# Patient Record
Sex: MALE | Race: WHITE | NOT HISPANIC OR LATINO
[De-identification: names, ages, dates, MRNs, and addresses within clinical notes are randomized per-mention and may not be internally consistent; named-entity substitution may affect disease eponyms.]

---

## 2022-05-02 ENCOUNTER — APPOINTMENT (OUTPATIENT)
Dept: ORTHOPEDIC SURGERY | Facility: CLINIC | Age: 52
End: 2022-05-02
Payer: OTHER MISCELLANEOUS

## 2022-05-02 VITALS — HEIGHT: 70 IN | WEIGHT: 234 LBS | BODY MASS INDEX: 33.5 KG/M2

## 2022-05-02 DIAGNOSIS — Z78.9 OTHER SPECIFIED HEALTH STATUS: ICD-10-CM

## 2022-05-02 DIAGNOSIS — M22.42 CHONDROMALACIA PATELLAE, LEFT KNEE: ICD-10-CM

## 2022-05-02 PROBLEM — Z00.00 ENCOUNTER FOR PREVENTIVE HEALTH EXAMINATION: Status: ACTIVE | Noted: 2022-05-02

## 2022-05-02 PROCEDURE — 20610 DRAIN/INJ JOINT/BURSA W/O US: CPT | Mod: LT

## 2022-05-02 PROCEDURE — 99213 OFFICE O/P EST LOW 20 MIN: CPT | Mod: 25

## 2022-05-02 PROCEDURE — 99072 ADDL SUPL MATRL&STAF TM PHE: CPT

## 2022-05-02 NOTE — PROCEDURE
[Large Joint Injection] : Large joint injection [Left] : of the left [Knee] : knee [Pain] : pain [Inflammation] : inflammation [X-ray evidence of Osteoarthritis on this or prior visit] : x-ray evidence of Osteoarthritis on this or prior visit [Alcohol] : alcohol [Sterile technique used] : sterile technique used [___ cc    3mg] :  Betamethasone (Celestone) ~Vcc of 3mg [___ cc    1%] : Lidocaine ~Vcc of 1%  [Call if redness, pain or fever occur] : call if redness, pain or fever occur [Apply ice for 15min out of every hour for the next 12-24 hours as tolerated] : apply ice for 15 minutes out of every hour for the next 12-24 hours as tolerated [Previous OTC use and PT nontherapeutic] : patient has tried OTC's including aspirin, Ibuprofen, Aleve, etc or prescription NSAIDS, and/or exercises at home and/or physical therapy without satisfactory response [Patient had decreased mobility in the joint] : patient had decreased mobility in the joint [Risks, benefits, alternatives discussed / Verbal consent obtained] : the risks benefits, and alternatives have been discussed, and verbal consent was obtained

## 2022-05-02 NOTE — HISTORY OF PRESENT ILLNESS
[Work related] : work related [Gradual] : gradual [8] : 8 [4] : 4 [Dull/Aching] : dull/aching [Constant] : constant [Household chores] : household chores [Sleep] : sleep [Rest] : rest [Full time] : Work status: full time [de-identified] : Patient is here for a follow up. Patient is being treated for left knee chondromalacia. Patient had last cortisone injection on 10/15/2021. Patient reports that just recently he had recurrence of his pain, interested in repeat CSI at this time. Patient is not a diabetic. [] : Post Surgical Visit: no [FreeTextEntry1] : Left knee [FreeTextEntry3] : 1/21/2013 [FreeTextEntry5] : Patient states he twisted knee coming down steps. 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

## 2022-05-02 NOTE — ASSESSMENT
[FreeTextEntry1] : 50 yo male presenting for f/u of moderate patellofemoral chondromalacia. Patient had CSI on 10/15/21 with approximately 7 months of relief with recent recurrence, interested in repeat CSI.\par -Repeat CSI given today, patient tolerated well\par -Activities as tolerated\par -Patient able to continue to work full duty without restrictions. \par -Rest, ice, NSAIDs PRN for pain\par -All questions answered\par -F/u PRN\par

## 2022-05-02 NOTE — PHYSICAL EXAM
[Left] : left knee [NL (140)] : flexion 140 degrees [NL (0)] : extension 0 degrees [5___] : hamstring 5[unfilled]/5 [] : non-antalgic

## 2022-10-01 ENCOUNTER — NON-APPOINTMENT (OUTPATIENT)
Age: 52
End: 2022-10-01

## 2022-10-04 ENCOUNTER — APPOINTMENT (OUTPATIENT)
Dept: NEUROSURGERY | Facility: CLINIC | Age: 52
End: 2022-10-04

## 2022-10-04 VITALS
SYSTOLIC BLOOD PRESSURE: 137 MMHG | TEMPERATURE: 97.9 F | DIASTOLIC BLOOD PRESSURE: 87 MMHG | WEIGHT: 227 LBS | HEART RATE: 69 BPM | BODY MASS INDEX: 32.5 KG/M2 | HEIGHT: 70 IN | OXYGEN SATURATION: 98 %

## 2022-10-04 PROCEDURE — 99203 OFFICE O/P NEW LOW 30 MIN: CPT

## 2022-10-04 NOTE — REASON FOR VISIT
[New Patient Visit] : a new patient visit [Spouse] : spouse [FreeTextEntry1] : incidental cervical syrinx, neck pain and bilateral arm symptoms
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
## 2022-11-07 ENCOUNTER — APPOINTMENT (OUTPATIENT)
Dept: NEUROSURGERY | Facility: CLINIC | Age: 52
End: 2022-11-07

## 2022-11-07 VITALS
BODY MASS INDEX: 32.5 KG/M2 | TEMPERATURE: 97.9 F | WEIGHT: 227 LBS | OXYGEN SATURATION: 96 % | HEIGHT: 70 IN | DIASTOLIC BLOOD PRESSURE: 87 MMHG | HEART RATE: 72 BPM | SYSTOLIC BLOOD PRESSURE: 142 MMHG

## 2022-11-07 DIAGNOSIS — R20.2 PARESTHESIA OF SKIN: ICD-10-CM

## 2022-11-07 DIAGNOSIS — M54.81 OCCIPITAL NEURALGIA: ICD-10-CM

## 2022-11-07 DIAGNOSIS — M54.2 CERVICALGIA: ICD-10-CM

## 2022-11-07 PROCEDURE — 99213 OFFICE O/P EST LOW 20 MIN: CPT

## 2022-11-07 NOTE — CONSULT LETTER
[Dear  ___] : Dear  [unfilled], [Courtesy Letter:] : I had the pleasure of seeing your patient, [unfilled], in my office today. [Sincerely,] : Sincerely, [FreeTextEntry1] : This very pleasant 52-year-old gentleman who is extremely fit and a former collegiate athlete and professional arm wrestler returns for further evaluation of his chronic neck pain with headaches as well as bilateral arm symptoms left worse than right.  At our previous assessment 1 month ago we reviewed MRI imaging of the cervical spine which delineated moderate degenerative spondylotic changes at C3-4 and a small incidental syrinx at C6-7.  However the imaging findings did not correlate with the patient's symptom profile.  The patient has now undergone an EMG nerve conduction study of the upper extremities.\par \par I have reviewed in detail with the patient his EMG results which did not identify any radiculopathy or peripheral compression neuropathy.\par \par I did review with the patient once again his MRI of the cervical spine performed at [facility] and pointed out the degenerative changes as indicated above.\par \par There is no change in the patient's neurologic examination since our previous assessment 4 weeks ago.\par \par I have discussed with the patient that I feel he has 2 distinct ongoing problems.  The patient's headache profile is more consistent with cervicalgia or suboccipital muscular driven headaches with a occipital neuralgia profile.  He has received benefit in the past from focused massage in this region with temporary but significant benefit.  I would like the patient to be seen by neurology headache specialist for the purpose of considering Botox or trigger point injections for a longer term solution to his profile.  In addition I will have the patient seen by an orthopedic specialist with respect to a possible myofascial compartment syndrome which is creating his peripheral upper extremity symptoms which are more profound at nighttime or whenever his arms are in a flexed position.\par \par The patient indicated good understanding of my descriptions and explanations and he is very interested in pursuing these treatment modalities.  I will see the patient again on an as-needed basis.\par \par Thank you for very kindly including me in the evaluation and support of your patient.  Please do not hesitate to contact me should you have any concerns or questions regarding this evaluation or the patient's ongoing follow-up care. [FreeTextEntry3] : Quentin Stuart MD, PhD, FRCPSC \par Attending Neurosurgeon \par  of Neurosurgery \par [facility] \par [address], [address] floor \[place] [address] \par Office: [phone] \par Fax: [phone]\par \par

## 2023-05-16 ENCOUNTER — APPOINTMENT (OUTPATIENT)
Dept: NEUROLOGY | Facility: CLINIC | Age: 53
End: 2023-05-16
Payer: COMMERCIAL

## 2023-05-16 VITALS
DIASTOLIC BLOOD PRESSURE: 87 MMHG | HEIGHT: 70 IN | WEIGHT: 220 LBS | BODY MASS INDEX: 31.5 KG/M2 | HEART RATE: 68 BPM | TEMPERATURE: 97.8 F | SYSTOLIC BLOOD PRESSURE: 135 MMHG

## 2023-05-16 DIAGNOSIS — R51.9 HEADACHE, UNSPECIFIED: ICD-10-CM

## 2023-05-16 PROCEDURE — 99204 OFFICE O/P NEW MOD 45 MIN: CPT

## 2023-05-16 NOTE — HISTORY OF PRESENT ILLNESS
[FreeTextEntry1] : 52-year-old man right-handed with a history of\par Headaches daily frequency.  Reports for the last year has been much on a regular basis headache of some urinary, usually starts in the temple area, feels a pressure heaviness can be crushing at times.  And severe enough that he can affect his concentration, he just wants to sit down in the quiet dark room.  No nausea no lightheadedness no change in vision, trouble speaking, no associated numbness tingling of the face, extremities.  Headaches may last hours, most common trigger is humidity changes.  In the neck because it can become very stiff.  Will take ibuprofen tonsils for fibrosa time.  Partial relief.  He has been evaluated in the past treated by chiropractic, neurosurgery, MRI of the cervical spine demonstrated multilevel degenerative disc, small syrinx at C6-C7.\par He also complains of numbness, tingling sensation of bothHands and arms, can wake him up at night, can wake him up during the morning.  During the day he is holding something grabbing something he will feel his ankle now.  No weakness, denies any sensation in the feet.\par An EMG nerve conduction performed last year, was unremarkable.

## 2023-05-16 NOTE — DISCUSSION/SUMMARY
[FreeTextEntry1] : 52-year-old man with a history of chronic headaches, unremarkable neurological examination.  No local tenderness is noted occipital or in the neck area.  Likely primary headache syndrome, chronic migraines, transformed migraines.\par We will request an MRI of the brain to rule out any structural normalities.\par Samples of Nurtec 75 mg daily as needed headache.\par Samples of Ubrelvy 100 mg tablet, 1 as needed for headache, can repeat within 2 hours.  Maximum dose of 200 mg/day.\par Patient advised to call for prescription, if useful.\par History of bilateral hand numbness, involving the hand sometimes the arms and cells.  Unlikely related to his MRI of the cervical spine findings.  Most likely peripheral nerve problem, either carpal tunnel although not proven by nerve conduction electromyography of the upper extremities, may also represent a more proximal median neuropathy at the wrist.\par Advised patient to reconsider repeating the study.\par Return after 1 month.

## 2023-05-16 NOTE — PHYSICAL EXAM
[Oriented To Time, Place, And Person] : oriented to person, place, and time [Impaired Insight] : insight and judgment were intact [Affect] : the affect was normal [Person] : oriented to person [Place] : oriented to place [Time] : oriented to time [Concentration Intact] : normal concentrating ability [Visual Intact] : visual attention was ~T not ~L decreased [Writing A Sentence] : no difficulty writing a sentence [Fluency] : fluency intact [Comprehension] : comprehension intact [Reading] : reading intact [Past History] : adequate knowledge of personal past history [Cranial Nerves Optic (II)] : visual acuity intact bilaterally,  visual fields full to confrontation, pupils equal round and reactive to light [Cranial Nerves Oculomotor (III)] : extraocular motion intact [Cranial Nerves Trigeminal (V)] : facial sensation intact symmetrically [Cranial Nerves Facial (VII)] : face symmetrical [Cranial Nerves Vestibulocochlear (VIII)] : hearing was intact bilaterally [Cranial Nerves Glossopharyngeal (IX)] : tongue and palate midline [Cranial Nerves Accessory (XI - Cranial And Spinal)] : head turning and shoulder shrug symmetric [Cranial Nerves Hypoglossal (XII)] : there was no tongue deviation with protrusion [Motor Tone] : muscle tone was normal in all four extremities [Motor Strength] : muscle strength was normal in all four extremities [No Muscle Atrophy] : normal bulk in all four extremities [Motor Handedness Right-Handed] : the patient is right hand dominant [Paresis Pronator Drift Right-Sided] : no pronator drift on the right [Paresis Pronator Drift Left-Sided] : no pronator drift on the left [Sensation Tactile Decrease] : light touch was intact [Abnormal Walk] : normal gait [Balance] : balance was intact [Past-pointing] : there was no past-pointing [Tremor] : no tremor present [Dysdiadochokinesia Bilaterally] : not present [Coordination - Dysmetria Impaired Finger-to-Nose Bilateral] : not present [1+] : Ankle jerk left 1+ [Neck Appearance] : the appearance of the neck was normal [] : the neck was supple [Neck Cervical Mass (___cm)] : no neck mass was observed [No Visual Abnormalities] : no visible abnormalities [Normal Lordosis] : normal lordosis [No Scoliosis] : no scoliosis [No Tenderness to Palpation] : no spine tenderness on palpation [No Masses] : no masses [Full ROM] : full ROM [No Pain with ROM] : no pain with motion in any direction

## 2023-06-02 ENCOUNTER — APPOINTMENT (OUTPATIENT)
Dept: MRI IMAGING | Facility: CLINIC | Age: 53
End: 2023-06-02
Payer: COMMERCIAL

## 2023-06-02 PROCEDURE — A9585: CPT

## 2023-06-02 PROCEDURE — 70553 MRI BRAIN STEM W/O & W/DYE: CPT

## 2023-06-19 ENCOUNTER — APPOINTMENT (OUTPATIENT)
Dept: NEUROLOGY | Facility: CLINIC | Age: 53
End: 2023-06-19
Payer: COMMERCIAL

## 2023-06-19 PROCEDURE — 99214 OFFICE O/P EST MOD 30 MIN: CPT | Mod: 95

## 2023-06-19 RX ORDER — SUMATRIPTAN 100 MG/1
100 TABLET, FILM COATED ORAL
Qty: 12 | Refills: 5 | Status: ACTIVE | COMMUNITY
Start: 2023-06-19 | End: 1900-01-01

## 2023-06-19 RX ORDER — RIMEGEPANT SULFATE 75 MG/75MG
TABLET, ORALLY DISINTEGRATING ORAL
Qty: 16 | Refills: 2 | Status: ACTIVE | COMMUNITY
Start: 2023-06-19

## 2023-06-19 NOTE — HISTORY OF PRESENT ILLNESS
[FreeTextEntry1] : 52-year-old man right-handed history of chronic headaches seen last in this office in May of this year, with a history of chronic headaches, previous evaluation including MRI of the cervical, and more recently MRI of the brain were unremarkable.\par Samples of Ubrelvy and Nurtec were given to the patient to try.  Presently taking Advil or Tylenol as needed for headache, which usually resolves 4.  Time is headache.  But eventually the headache returns.\par Headache is felt mostly pressure heaviness, although occasionally can get lightheaded with it.\par

## 2023-06-19 NOTE — DISCUSSION/SUMMARY
[FreeTextEntry1] : 52-year-old man right-handed history of chronic headaches likely mixed headache type recent MRI of the brain was unremarkable.  Previous MRI of the cervical spine with multilevel degenerative disc, small syrinx at C6-7.\par Reviewed and discussed treatment options.\par Plan: We will do a trial with Nurtec 75 mg p.o. q. OD.\par Prescription for sumatriptan 100 mg tablet, take 1 as needed for headache, can repeat within 2 hours.  Maximum dose of 200 mg/day.\par Advised to maintain a headache diary.\par Return to the office, 3 months.

## 2023-06-19 NOTE — REASON FOR VISIT
[Medical Office: ([facility])___] : at the medical office located in  [Patient] : the patient [Home] : at home, [unfilled] , at the time of the visit.

## 2023-06-23 ENCOUNTER — RX RENEWAL (OUTPATIENT)
Age: 53
End: 2023-06-23

## 2023-07-25 ENCOUNTER — RX RENEWAL (OUTPATIENT)
Age: 53
End: 2023-07-25

## 2023-08-04 ENCOUNTER — APPOINTMENT (OUTPATIENT)
Dept: ORTHOPEDIC SURGERY | Facility: CLINIC | Age: 53
End: 2023-08-04
Payer: OTHER MISCELLANEOUS

## 2023-08-04 DIAGNOSIS — M23.92 UNSPECIFIED INTERNAL DERANGEMENT OF LEFT KNEE: ICD-10-CM

## 2023-08-04 PROCEDURE — 20610 DRAIN/INJ JOINT/BURSA W/O US: CPT | Mod: LT

## 2023-08-04 PROCEDURE — 99213 OFFICE O/P EST LOW 20 MIN: CPT | Mod: 25

## 2023-08-04 NOTE — PHYSICAL EXAM
[de-identified] : Examination of the left knee is as follows: INSPECTION: mild effusion, but no ecchymosis, no erythema, no atrophy, no deformities of quad tendon or of patellar tendon PALPATION: medial joint line tenderness, but no palpable mass, no obvious defects, no increased warmth, no calf tenderness ROM: flexion 125 degrees, but extension 0 degrees STRENGTH: quadriceps 5/5, hamstring 5/5 TESTING: ligamentously stable NEURO: light touch is intact throughout GAIT: antalgic, but patient ambulates without assistive device  X-rays of the left knee is as follows:  Knee 3 view in the anteroposterior, lateral, skyline views: moderate tricompartmental OA with medial joint space narrowing

## 2023-08-04 NOTE — HISTORY OF PRESENT ILLNESS
[Work related] : work related [Sudden] : sudden [7] : 7 [0] : 0 [Dull/Aching] : dull/aching [Throbbing] : throbbing [Intermittent] : intermittent [Household chores] : household chores [Leisure] : leisure [Social interactions] : social interactions [Rest] : rest [Full time] : Work status: full time [de-identified] : Patient is here for left knee. Patient states he was walking downstairs and twisted his knee. Patient had SX with Dr. Snow in 2013. Patient states he had a CSI approx 6 months ago. Patient states he has a constant dull ache with weight bearing. Patient would like another cortisone injection today.  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

## 2023-08-04 NOTE — PROCEDURE
[Large Joint Injection] : Large joint injection [Left] : of the left [Knee] : knee [Pain] : pain [Inflammation] : inflammation [Alcohol] : alcohol [___ cc    3mg] :  Betamethasone (Celestone) ~Vcc of 3mg [___ cc    1%] : Lidocaine ~Vcc of 1%

## 2023-08-04 NOTE — ASSESSMENT
[FreeTextEntry1] : 54yo male with mild to moderate left knee oa with possible meniscal tear.  Patient has had previous csi over 6 months ago with good resolution of symptoms.  Patient reports new onset pain that is similar to previous symptoms while working out at the gym.  Recommend repeat csi today.  .left knee csi given today in office and well tolerated.   -Activities as tolerated -Rest, ice, compression, elevation, NSAIDs PRN for pain.  -All questions answered -F/u prn  The diagnosis was explained in detail. The potential non-surgical and surgical treatments were reviewed. The relative risks and benefits of each option were considered relative to the patients age, activity level, medical history, symptom severity and previously attempted treatments.  The patient was advised to consult with their primary medical provider prior to initiation of any new medications to reduce the risk of adverse effects specific to their long-term home medications and medical history. The risk of gastrointestinal irritation and kidney injury specific to long-term NSAID use was discussed.

## 2023-09-01 ENCOUNTER — RX RENEWAL (OUTPATIENT)
Age: 53
End: 2023-09-01

## 2023-10-02 ENCOUNTER — RX RENEWAL (OUTPATIENT)
Age: 53
End: 2023-10-02

## 2023-10-31 ENCOUNTER — RX RENEWAL (OUTPATIENT)
Age: 53
End: 2023-10-31

## 2023-10-31 RX ORDER — VERAPAMIL HYDROCHLORIDE 120 MG/1
120 CAPSULE, EXTENDED RELEASE ORAL
Qty: 90 | Refills: 0 | Status: ACTIVE | COMMUNITY
Start: 2023-06-23 | End: 1900-01-01

## 2023-12-11 ENCOUNTER — APPOINTMENT (OUTPATIENT)
Dept: NEUROLOGY | Facility: CLINIC | Age: 53
End: 2023-12-11

## 2024-04-02 ENCOUNTER — APPOINTMENT (OUTPATIENT)
Dept: ORTHOPEDIC SURGERY | Facility: CLINIC | Age: 54
End: 2024-04-02
Payer: OTHER MISCELLANEOUS

## 2024-04-02 DIAGNOSIS — M17.12 UNILATERAL PRIMARY OSTEOARTHRITIS, LEFT KNEE: ICD-10-CM

## 2024-04-02 PROCEDURE — 20610 DRAIN/INJ JOINT/BURSA W/O US: CPT | Mod: LT

## 2024-04-02 PROCEDURE — 73562 X-RAY EXAM OF KNEE 3: CPT | Mod: LT

## 2024-04-02 PROCEDURE — 99213 OFFICE O/P EST LOW 20 MIN: CPT | Mod: 25

## 2024-04-02 NOTE — PHYSICAL EXAM
[de-identified] : Examination of the left knee is as follows: INSPECTION: mild effusion, but no ecchymosis, no erythema, no atrophy, no deformities of quad tendon or of patellar tendon PALPATION: medial joint line tenderness, but no palpable mass, no obvious defects, no increased warmth, no calf tenderness ROM: flexion 125 degrees, but extension 0 degrees STRENGTH: quadriceps 5/5, hamstring 5/5 TESTING: ligamentously stable NEURO: light touch is intact throughout GAIT: antalgic, but patient ambulates without assistive device  X-rays of the left knee is as follows:  Knee 3 view in the anteroposterior, lateral, skyline views: moderate tricompartmental OA with medial joint space narrowing Rifampin Counseling: I discussed with the patient the risks of rifampin including but not limited to liver damage, kidney damage, red-orange body fluids, nausea/vomiting and severe allergy.

## 2024-04-02 NOTE — HISTORY OF PRESENT ILLNESS
[Work related] : work related [Sudden] : sudden [7] : 7 [Dull/Aching] : dull/aching [0] : 0 [Throbbing] : throbbing [Intermittent] : intermittent [Household chores] : household chores [Leisure] : leisure [Social interactions] : social interactions [Rest] : rest [Full time] : Work status: full time [de-identified] : Patient is here for left knee. Patient being treated for moderate left knee oa with possible meniscal tear. Patient had CSI on8/4/23. Patient states injection helped until recently then pain started to return. Patient would like another cortisone injection today.  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

## 2024-04-02 NOTE — ASSESSMENT
[FreeTextEntry1] : 52 yo male with moderate left knee oa with possible meniscal tear.  Patient has had previous csi  on 8/4/23 with good resolution of symptoms. Recommend repeat csi today. -Discussed with patient that when pain worsens that they will be indicated for TKA, patient understands -Left knee csi given today in office and well tolerated.   -Activities as tolerated -Rest, ice, compression, elevation, NSAIDs PRN for pain.  -All questions answered -F/u prn  The diagnosis was explained in detail. The potential non-surgical and surgical treatments were reviewed. The relative risks and benefits of each option were considered relative to the patients age, activity level, medical history, symptom severity and previously attempted treatments.  The patient was advised to consult with their primary medical provider prior to initiation of any new medications to reduce the risk of adverse effects specific to their long-term home medications and medical history. The risk of gastrointestinal irritation and kidney injury specific to long-term NSAID use was discussed.   Entered by Gurjit Morrell PA-C acting as scribe. Dr. Snow Attestation The documentation recorded by the scribe, in my presence, accurately reflects the service I, Dr. Snow, personally performed, and the decisions made by me with my edits as appropriate.

## 2024-04-02 NOTE — PROCEDURE
[Large Joint Injection] : Large joint injection [Left] : of the left [Knee] : knee [Pain] : pain [Inflammation] : inflammation [Alcohol] : alcohol [___ cc    3mg] :  Betamethasone (Celestone) ~Vcc of 3mg [___ cc    1%] : Lidocaine ~Vcc of 1%  [] : Patient tolerated procedure well [Call if redness, pain or fever occur] : call if redness, pain or fever occur [Apply ice for 15min out of every hour for the next 12-24 hours as tolerated] : apply ice for 15 minutes out of every hour for the next 12-24 hours as tolerated [Previous OTC use and PT nontherapeutic] : patient has tried OTC's including aspirin, Ibuprofen, Aleve, etc or prescription NSAIDS, and/or exercises at home and/or physical therapy without satisfactory response [Risks, benefits, alternatives discussed / Verbal consent obtained] : the risks benefits, and alternatives have been discussed, and verbal consent was obtained [Patient had decreased mobility in the joint] : patient had decreased mobility in the joint

## 2025-09-12 ENCOUNTER — APPOINTMENT (OUTPATIENT)
Dept: ORTHOPEDIC SURGERY | Facility: CLINIC | Age: 55
End: 2025-09-12
Payer: COMMERCIAL

## 2025-09-12 VITALS — HEIGHT: 70 IN | WEIGHT: 229 LBS | BODY MASS INDEX: 32.78 KG/M2

## 2025-09-12 DIAGNOSIS — M79.645 PAIN IN LEFT FINGER(S): ICD-10-CM

## 2025-09-12 DIAGNOSIS — M18.0 BILATERAL PRIMARY OSTEOARTHRITIS OF FIRST CARPOMETACARPAL JOINTS: ICD-10-CM

## 2025-09-12 DIAGNOSIS — M79.644 PAIN IN RIGHT FINGER(S): ICD-10-CM

## 2025-09-12 PROCEDURE — 20605 DRAIN/INJ JOINT/BURSA W/O US: CPT | Mod: LT

## 2025-09-12 PROCEDURE — 73130 X-RAY EXAM OF HAND: CPT | Mod: 50

## 2025-09-12 PROCEDURE — 99204 OFFICE O/P NEW MOD 45 MIN: CPT | Mod: 25

## 2025-09-12 RX ORDER — MELOXICAM 7.5 MG/1
7.5 TABLET ORAL
Qty: 30 | Refills: 1 | Status: ACTIVE | COMMUNITY
Start: 2025-09-12 | End: 1900-01-01